# Patient Record
Sex: FEMALE | Race: WHITE
[De-identification: names, ages, dates, MRNs, and addresses within clinical notes are randomized per-mention and may not be internally consistent; named-entity substitution may affect disease eponyms.]

---
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
## 2023-07-09 ENCOUNTER — HOSPITAL ENCOUNTER (EMERGENCY)
Age: 18
Discharge: HOME | End: 2023-07-09
Payer: COMMERCIAL

## 2023-07-09 VITALS
SYSTOLIC BLOOD PRESSURE: 111 MMHG | RESPIRATION RATE: 18 BRPM | DIASTOLIC BLOOD PRESSURE: 66 MMHG | OXYGEN SATURATION: 97 % | HEART RATE: 58 BPM

## 2023-07-09 VITALS
TEMPERATURE: 97.88 F | SYSTOLIC BLOOD PRESSURE: 156 MMHG | DIASTOLIC BLOOD PRESSURE: 94 MMHG | RESPIRATION RATE: 18 BRPM | HEART RATE: 91 BPM | OXYGEN SATURATION: 97 %

## 2023-07-09 VITALS
TEMPERATURE: 97.8 F | HEART RATE: 63 BPM | OXYGEN SATURATION: 100 % | RESPIRATION RATE: 18 BRPM | DIASTOLIC BLOOD PRESSURE: 60 MMHG | SYSTOLIC BLOOD PRESSURE: 112 MMHG

## 2023-07-09 VITALS
OXYGEN SATURATION: 96 % | RESPIRATION RATE: 20 BRPM | SYSTOLIC BLOOD PRESSURE: 141 MMHG | HEART RATE: 66 BPM | DIASTOLIC BLOOD PRESSURE: 62 MMHG

## 2023-07-09 VITALS — BODY MASS INDEX: 29.7 KG/M2

## 2023-07-09 DIAGNOSIS — R10.9: ICD-10-CM

## 2023-07-09 DIAGNOSIS — N93.9: Primary | ICD-10-CM

## 2023-07-09 LAB
ANION GAP SERPL CALC-SCNC: 9.9 MEQ/L (ref 5–15)
BUN SERPL-MCNC: 10 MG/DL (ref 7–17)
CALCIUM SPEC-MCNC: 8.7 MG/DL (ref 8.4–10.2)
CHLORIDE SPEC-SCNC: 107 MMOL/L (ref 98–107)
CO2 SERPL-SCNC: 27 MMOL/L (ref 22–30)
CREAT BLD-SCNC: 0.7 MG/DL (ref 0.52–1.04)
CREATININE CLEARANCE ESTIMATED: 156 ML/MIN (ref 50–200)
ESTIMATED GLOMERULAR FILT RATE: 108 ML/MIN (ref 60–?)
GFR (AFRICAN AMERICAN): 130 ML/MIN (ref 60–?)
GLUCOSE: 93 MG/DL (ref 74–100)
HCT VFR BLD CALC: 39.7 % (ref 37–47)
HGB BLD-MCNC: 12.8 G/DL (ref 12.2–16.2)
MCHC RBC-ENTMCNC: 32.1 G/DL (ref 31.8–35.4)
MCV RBC: 87 FL (ref 81–99)
MEAN CORPUSCULAR HEMOGLOBIN: 28 PG (ref 27–31.2)
PLATELET # BLD: 224 K/MM3 (ref 142–424)
POTASSIUM: 3.9 MMOL/L (ref 3.5–5.1)
RBC # BLD AUTO: 4.56 M/MM3 (ref 4.2–5.4)
SODIUM SPEC-SCNC: 140 MMOL/L (ref 136–145)
WBC # BLD AUTO: 10.2 K/MM3 (ref 4.5–13)

## 2023-07-09 PROCEDURE — 85025 COMPLETE CBC W/AUTO DIFF WBC: CPT

## 2023-07-09 PROCEDURE — 96375 TX/PRO/DX INJ NEW DRUG ADDON: CPT

## 2023-07-09 PROCEDURE — 96374 THER/PROPH/DIAG INJ IV PUSH: CPT

## 2023-07-09 PROCEDURE — 99284 EMERGENCY DEPT VISIT MOD MDM: CPT

## 2023-07-09 PROCEDURE — 84703 CHORIONIC GONADOTROPIN ASSAY: CPT

## 2023-07-09 PROCEDURE — 80048 BASIC METABOLIC PNL TOTAL CA: CPT

## 2023-07-26 ENCOUNTER — OFFICE VISIT (OUTPATIENT)
Dept: OBSTETRICS AND GYNECOLOGY | Facility: CLINIC | Age: 18
End: 2023-07-26
Payer: COMMERCIAL

## 2023-07-26 VITALS
HEIGHT: 63 IN | SYSTOLIC BLOOD PRESSURE: 100 MMHG | WEIGHT: 167.2 LBS | BODY MASS INDEX: 29.62 KG/M2 | DIASTOLIC BLOOD PRESSURE: 70 MMHG

## 2023-07-26 DIAGNOSIS — Z20.2 POSSIBLE EXPOSURE TO STD: Primary | ICD-10-CM

## 2023-07-26 DIAGNOSIS — Z30.8 ENCOUNTER FOR OTHER CONTRACEPTIVE MANAGEMENT: ICD-10-CM

## 2023-07-26 NOTE — PROGRESS NOTES
"            Chief Complaint   Patient presents with    STD Testing       Subjective   HPI  Heather Stallings is a 17 y.o. female, .   Patient's last menstrual period was 2023 (exact date).. who presents for repeat STD testing.  The sample that was collected on 2023 could not be tested.  The pain that she complained of then has resolved.  She has no complaints.  Patient would also like to discuss going on birth control today while she is here.  She is interested in Nexplanon.     Additional OB/GYN History     Last Pap : never due to age   Last mammogram: never     OB History          0    Para   0    Term   0       0    AB   0    Living   0         SAB   0    IAB   0    Ectopic   0    Molar   0    Multiple   0    Live Births   0                  Current Outpatient Medications:     fluconazole (Diflucan) 150 MG tablet, 1 po now and repeat in 3 days, Disp: 2 tablet, Rfl: 0    ibuprofen (ADVIL,MOTRIN) 600 MG tablet, Take 1 tablet by mouth Every 6 (Six) Hours As Needed for Mild Pain., Disp: 30 tablet, Rfl: 0    montelukast (SINGULAIR) 10 MG tablet, Take 1 tablet by mouth Every Night., Disp: , Rfl:      Past Medical History:   Diagnosis Date    Allergies     Ovarian cyst         Past Surgical History:   Procedure Laterality Date    TONSILLECTOMY         The additional following portions of the patient's history were reviewed and updated as appropriate: allergies, current medications, past family history, past medical history, past social history, past surgical history, and problem list.    Review of Systems   All other systems reviewed and are negative.    I have reviewed and agree with the HPI, ROS, and historical information as entered above. Cris Rahman, APRN      Objective   /70   Ht 160 cm (62.99\")   Wt 75.8 kg (167 lb 3.2 oz)   LMP 2023 (Exact Date)   BMI 29.63 kg/m²     Physical Exam  Vitals and nursing note reviewed. Exam conducted with a chaperone present. "   Constitutional:       General: She is not in acute distress.     Appearance: Normal appearance. She is not ill-appearing.   Pulmonary:      Effort: Pulmonary effort is normal. No respiratory distress.   Abdominal:      General: There is no distension.      Palpations: Abdomen is soft. There is no mass.      Tenderness: There is no abdominal tenderness. There is no guarding or rebound.      Hernia: No hernia is present.   Genitourinary:     General: Normal vulva.      Vagina: Normal.      Cervix: Normal.      Uterus: Normal.       Adnexa: Right adnexa normal and left adnexa normal.   Skin:     General: Skin is warm and dry.   Neurological:      Mental Status: She is alert and oriented to person, place, and time.   Psychiatric:         Mood and Affect: Mood normal.         Behavior: Behavior normal.       Assessment & Plan     Assessment     Problem List Items Addressed This Visit    None  Visit Diagnoses       Possible exposure to STD    -  Primary    Relevant Orders    Chlamydia trachomatis, Neisseria gonorrhoeae, Trichomonas vaginalis, PCR - Swab, Cervix    Encounter for other contraceptive management                Plan     Rev risks, benefits, side effects of Nexplanon.  No UPI for at least 2 wks and call with next period for insertion appt.  Info given.  Questions answered.  Will notify of Nuswab results.      Cris Rahman, APRN  07/26/2023

## 2023-07-31 LAB
C TRACH RRNA SPEC QL NAA+PROBE: NEGATIVE
N GONORRHOEA RRNA SPEC QL NAA+PROBE: NEGATIVE
T VAGINALIS RRNA SPEC QL NAA+PROBE: NEGATIVE

## 2023-08-09 ENCOUNTER — OFFICE VISIT (OUTPATIENT)
Dept: OBSTETRICS AND GYNECOLOGY | Facility: CLINIC | Age: 18
End: 2023-08-09
Payer: COMMERCIAL

## 2023-08-09 VITALS
RESPIRATION RATE: 18 BRPM | HEIGHT: 63 IN | SYSTOLIC BLOOD PRESSURE: 106 MMHG | WEIGHT: 165 LBS | BODY MASS INDEX: 29.23 KG/M2 | DIASTOLIC BLOOD PRESSURE: 72 MMHG

## 2023-08-09 DIAGNOSIS — Z30.017 INSERTION OF NEXPLANON: ICD-10-CM

## 2023-08-09 DIAGNOSIS — Z32.02 ENCOUNTER FOR PREGNANCY TEST WITH RESULT NEGATIVE: Primary | ICD-10-CM

## 2023-08-09 LAB
B-HCG UR QL: NEGATIVE
EXPIRATION DATE: NORMAL
INTERNAL NEGATIVE CONTROL: NEGATIVE
INTERNAL POSITIVE CONTROL: POSITIVE
Lab: NORMAL

## 2023-08-09 NOTE — PROGRESS NOTES
Nexplanon Insertion:    Procedures    Pre Dx: 1) Nexplanon Insertion   Post Dx: 1) Nexplanon Insertion     NDC #: 4411-2904-48  Lot #: [id number]   Exp Date: 05192025  BH device  Patient's LMP was 08/05/2023  She did have a UPT in office today. The results were Negative.    Risks, benefits, alternatives explained. All questions answered. Consents signed.    Patient does not have an allergy to betadine or shellfish.    Patient placed on examined table in supine position with her Left arm flexed at the elbow and hand resting under her head.  The area for insertion prepped with betadine in a sterile fashion.      The insertion site was identified approximately 8-10 cm proximal to the humoral epicondyle and 3-4 cm below with sulcus of the triceps and biceps muscle. The skin at the insertion site was stretched by my thumb and index finger. The insertion site was anesthetized about 3 mL of 1% lidocaine. Nex, the needle tip was inserted, bevel side up, at an angle not greater than 30ø to the skin surface, just until the skin was penetrated to below the bevel. The applicator was then lowered so that it was parallel to the arm. To minimize the chance of deep incision, the skin was tented upwards with the tip of the needle. The needle was then gently inserted to the full length and the device was fixed in place. I then slowly and carefully retracted the needle back by retracting the release lever. The obturator was seen in the device to determine that the nexplanon had been released.     Both the patient and I were easily able to feel the device under the skin. Steri-Strips were applied at the site, and the arm was gently wrapped with gauze.    There were no complications.   The patient tolerated the procedure well.    She was given a reminder card. She was advised to use condoms as a backup method for at least a week after insertion.     She understands that the device terms in three years.     Expectations, warning  signs and limitations reviewed.     Cris Rahman, APRN  08/09/2023

## 2023-09-23 ENCOUNTER — HOSPITAL ENCOUNTER (EMERGENCY)
Age: 18
Discharge: HOME | End: 2023-09-23
Payer: COMMERCIAL

## 2023-09-23 VITALS
OXYGEN SATURATION: 100 % | SYSTOLIC BLOOD PRESSURE: 119 MMHG | DIASTOLIC BLOOD PRESSURE: 73 MMHG | RESPIRATION RATE: 20 BRPM | HEART RATE: 65 BPM

## 2023-09-23 VITALS
RESPIRATION RATE: 20 BRPM | OXYGEN SATURATION: 100 % | SYSTOLIC BLOOD PRESSURE: 135 MMHG | HEART RATE: 99 BPM | DIASTOLIC BLOOD PRESSURE: 89 MMHG

## 2023-09-23 VITALS
RESPIRATION RATE: 17 BRPM | OXYGEN SATURATION: 98 % | TEMPERATURE: 98 F | HEART RATE: 63 BPM | DIASTOLIC BLOOD PRESSURE: 69 MMHG | SYSTOLIC BLOOD PRESSURE: 119 MMHG

## 2023-09-23 VITALS
SYSTOLIC BLOOD PRESSURE: 105 MMHG | HEART RATE: 65 BPM | RESPIRATION RATE: 16 BRPM | OXYGEN SATURATION: 100 % | DIASTOLIC BLOOD PRESSURE: 62 MMHG | TEMPERATURE: 98.42 F

## 2023-09-23 VITALS — BODY MASS INDEX: 29.2 KG/M2

## 2023-09-23 DIAGNOSIS — S00.93XA: Primary | ICD-10-CM
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
## 2023-09-23 PROCEDURE — 99283 EMERGENCY DEPT VISIT LOW MDM: CPT

## 2023-09-23 PROCEDURE — 96372 THER/PROPH/DIAG INJ SC/IM: CPT

## 2024-01-10 ENCOUNTER — HOSPITAL ENCOUNTER (EMERGENCY)
Age: 19
LOS: 1 days | Discharge: HOME | End: 2024-01-11
Payer: COMMERCIAL

## 2024-01-10 VITALS
RESPIRATION RATE: 15 BRPM | SYSTOLIC BLOOD PRESSURE: 156 MMHG | OXYGEN SATURATION: 99 % | DIASTOLIC BLOOD PRESSURE: 101 MMHG | TEMPERATURE: 97.88 F | HEART RATE: 109 BPM

## 2024-01-10 VITALS — BODY MASS INDEX: 30.8 KG/M2

## 2024-01-10 DIAGNOSIS — H57.11: Primary | ICD-10-CM

## 2024-01-10 PROCEDURE — 99283 EMERGENCY DEPT VISIT LOW MDM: CPT

## 2024-01-11 VITALS
TEMPERATURE: 98.06 F | DIASTOLIC BLOOD PRESSURE: 85 MMHG | RESPIRATION RATE: 14 BRPM | SYSTOLIC BLOOD PRESSURE: 131 MMHG | OXYGEN SATURATION: 100 % | HEART RATE: 67 BPM

## 2024-02-29 ENCOUNTER — HOSPITAL ENCOUNTER (EMERGENCY)
Age: 19
Discharge: HOME | End: 2024-02-29
Payer: COMMERCIAL

## 2024-02-29 VITALS
OXYGEN SATURATION: 100 % | TEMPERATURE: 98.24 F | SYSTOLIC BLOOD PRESSURE: 132 MMHG | RESPIRATION RATE: 22 BRPM | HEART RATE: 92 BPM | DIASTOLIC BLOOD PRESSURE: 78 MMHG

## 2024-02-29 VITALS
SYSTOLIC BLOOD PRESSURE: 136 MMHG | HEART RATE: 90 BPM | OXYGEN SATURATION: 100 % | DIASTOLIC BLOOD PRESSURE: 72 MMHG | TEMPERATURE: 98.3 F | RESPIRATION RATE: 20 BRPM

## 2024-02-29 VITALS
DIASTOLIC BLOOD PRESSURE: 120 MMHG | RESPIRATION RATE: 20 BRPM | OXYGEN SATURATION: 100 % | SYSTOLIC BLOOD PRESSURE: 138 MMHG | HEART RATE: 97 BPM

## 2024-02-29 VITALS — OXYGEN SATURATION: 100 % | DIASTOLIC BLOOD PRESSURE: 73 MMHG | HEART RATE: 65 BPM | SYSTOLIC BLOOD PRESSURE: 127 MMHG

## 2024-02-29 VITALS
OXYGEN SATURATION: 100 % | DIASTOLIC BLOOD PRESSURE: 76 MMHG | SYSTOLIC BLOOD PRESSURE: 126 MMHG | HEART RATE: 84 BPM | RESPIRATION RATE: 20 BRPM

## 2024-02-29 VITALS — BODY MASS INDEX: 30.1 KG/M2

## 2024-02-29 VITALS — HEART RATE: 67 BPM | DIASTOLIC BLOOD PRESSURE: 62 MMHG | OXYGEN SATURATION: 100 % | SYSTOLIC BLOOD PRESSURE: 111 MMHG

## 2024-02-29 DIAGNOSIS — F17.200: ICD-10-CM

## 2024-02-29 DIAGNOSIS — R11.2: ICD-10-CM

## 2024-02-29 DIAGNOSIS — R10.13: Primary | ICD-10-CM

## 2024-02-29 LAB
ALBUMIN LEVEL: 4 G/DL (ref 3.5–5)
ALBUMIN/GLOB SERPL: 1.5 {RATIO} (ref 1.1–1.8)
ALP ISO SERPL-ACNC: 50 U/L (ref 38–126)
ALT SERPLBLD-CCNC: 17 U/L (ref 12–78)
ANION GAP SERPL CALC-SCNC: 3.9 MEQ/L (ref 5–15)
AST SERPL QL: 25 U/L (ref 14–36)
BILIRUBIN,TOTAL: 0.4 MG/DL (ref 0.2–1.3)
BUN SERPL-MCNC: 10 MG/DL (ref 7–17)
CALCIUM SPEC-MCNC: 8.7 MG/DL (ref 8.4–10.2)
CHLORIDE SPEC-SCNC: 109 MMOL/L (ref 98–107)
CO2 SERPL-SCNC: 29 MMOL/L (ref 22–30)
COLOR UR: YELLOW
CREATININE CLEARANCE ESTIMATED: 137 ML/MIN (ref 50–200)
CREATININE,SERUM: 0.8 MG/DL (ref 0.52–1.04)
ESTIMATED GLOMERULAR FILT RATE: 91 ML/MIN (ref 60–?)
GFR (AFRICAN AMERICAN): 111 ML/MIN (ref 60–?)
GLOBULIN SER CALC-MCNC: 2.6 G/DL (ref 1.3–3.2)
GLUCOSE: 108 MG/DL (ref 74–100)
HCT VFR BLD CALC: 37.4 % (ref 37–47)
HGB BLD-MCNC: 12.3 G/DL (ref 12.2–16.2)
LIPASE: 40 U/L (ref 23–300)
MCHC RBC-ENTMCNC: 33 G/DL (ref 31.8–35.4)
MCV RBC: 93.1 FL (ref 81–99)
MEAN CORPUSCULAR HEMOGLOBIN: 30.7 PG (ref 27–31.2)
MICRO URNS: (no result)
PH UR: 7 [PH] (ref 5–8.5)
PLATELET # BLD: 240 K/MM3 (ref 142–424)
POTASSIUM: 3.9 MMOL/L (ref 3.5–5.1)
PROT SERPL-MCNC: 6.6 G/DL (ref 6.3–8.2)
RBC # BLD AUTO: 4.02 M/MM3 (ref 4.2–5.4)
SODIUM SPEC-SCNC: 138 MMOL/L (ref 136–145)
SP GR UR: 1.01 (ref 1–1.03)
UROBILINOGEN UR QL: 0.2 EU/DL
WBC # BLD AUTO: 6.3 K/MM3 (ref 4.5–13)

## 2024-02-29 PROCEDURE — 80053 COMPREHEN METABOLIC PANEL: CPT

## 2024-02-29 PROCEDURE — 96375 TX/PRO/DX INJ NEW DRUG ADDON: CPT

## 2024-02-29 PROCEDURE — 99285 EMERGENCY DEPT VISIT HI MDM: CPT

## 2024-02-29 PROCEDURE — 87636 SARSCOV2 & INF A&B AMP PRB: CPT

## 2024-02-29 PROCEDURE — 74177 CT ABD & PELVIS W/CONTRAST: CPT

## 2024-02-29 PROCEDURE — 84702 CHORIONIC GONADOTROPIN TEST: CPT

## 2024-02-29 PROCEDURE — 85025 COMPLETE CBC W/AUTO DIFF WBC: CPT

## 2024-02-29 PROCEDURE — 96361 HYDRATE IV INFUSION ADD-ON: CPT

## 2024-02-29 PROCEDURE — 83605 ASSAY OF LACTIC ACID: CPT

## 2024-02-29 PROCEDURE — 81001 URINALYSIS AUTO W/SCOPE: CPT

## 2024-02-29 PROCEDURE — 83690 ASSAY OF LIPASE: CPT

## 2024-02-29 PROCEDURE — 96374 THER/PROPH/DIAG INJ IV PUSH: CPT

## 2024-03-04 ENCOUNTER — HOSPITAL ENCOUNTER (EMERGENCY)
Age: 19
Discharge: HOME | End: 2024-03-04
Payer: COMMERCIAL

## 2024-03-04 VITALS — HEART RATE: 64 BPM | DIASTOLIC BLOOD PRESSURE: 69 MMHG | OXYGEN SATURATION: 100 % | SYSTOLIC BLOOD PRESSURE: 93 MMHG

## 2024-03-04 VITALS
RESPIRATION RATE: 20 BRPM | OXYGEN SATURATION: 98 % | HEART RATE: 94 BPM | DIASTOLIC BLOOD PRESSURE: 72 MMHG | SYSTOLIC BLOOD PRESSURE: 119 MMHG | TEMPERATURE: 98.42 F

## 2024-03-04 VITALS — BODY MASS INDEX: 0.3 KG/M2

## 2024-03-04 VITALS
DIASTOLIC BLOOD PRESSURE: 52 MMHG | RESPIRATION RATE: 16 BRPM | OXYGEN SATURATION: 100 % | SYSTOLIC BLOOD PRESSURE: 101 MMHG | HEART RATE: 60 BPM | TEMPERATURE: 98.5 F

## 2024-03-04 DIAGNOSIS — F17.210: ICD-10-CM

## 2024-03-04 DIAGNOSIS — R11.2: ICD-10-CM

## 2024-03-04 DIAGNOSIS — R10.84: Primary | ICD-10-CM

## 2024-03-04 LAB
ALBUMIN LEVEL: 4.6 G/DL (ref 3.5–5)
ALBUMIN/GLOB SERPL: 1.8 {RATIO} (ref 1.1–1.8)
ALP ISO SERPL-ACNC: 54 U/L (ref 38–126)
ALT SERPLBLD-CCNC: 17 U/L (ref 12–78)
AMORPH SED URNS QL MICRO: (no result) /LPF
ANION GAP SERPL CALC-SCNC: 6.2 MEQ/L (ref 5–15)
AST SERPL QL: 24 U/L (ref 14–36)
BILIRUBIN,TOTAL: 0.3 MG/DL (ref 0.2–1.3)
BUN SERPL-MCNC: 10 MG/DL (ref 7–17)
CALCIUM SPEC-MCNC: 9.1 MG/DL (ref 8.4–10.2)
CHLORIDE SPEC-SCNC: 105 MMOL/L (ref 98–107)
CO2 SERPL-SCNC: 30 MMOL/L (ref 22–30)
COLOR UR: YELLOW
CREATININE CLEARANCE ESTIMATED: 137 ML/MIN (ref 50–200)
CREATININE,SERUM: 0.8 MG/DL (ref 0.52–1.04)
ESTIMATED GLOMERULAR FILT RATE: 91 ML/MIN (ref 60–?)
GFR (AFRICAN AMERICAN): 111 ML/MIN (ref 60–?)
GLOBULIN SER CALC-MCNC: 2.5 G/DL (ref 1.3–3.2)
GLUCOSE: 91 MG/DL (ref 74–100)
HCT VFR BLD CALC: 39.7 % (ref 37–47)
HGB BLD-MCNC: 12.5 G/DL (ref 12.2–16.2)
LIPASE: 35 U/L (ref 23–300)
MCHC RBC-ENTMCNC: 31.4 G/DL (ref 31.8–35.4)
MCV RBC: 92.9 FL (ref 81–99)
MEAN CORPUSCULAR HEMOGLOBIN: 29.2 PG (ref 27–31.2)
MICRO URNS: (no result)
PH UR: 8 [PH] (ref 5–8.5)
PLATELET # BLD: 259 K/MM3 (ref 142–424)
POTASSIUM: 4.2 MMOL/L (ref 3.5–5.1)
PROT SERPL-MCNC: 7.1 G/DL (ref 6.3–8.2)
RBC # BLD AUTO: 4.27 M/MM3 (ref 4.2–5.4)
SODIUM SPEC-SCNC: 137 MMOL/L (ref 136–145)
SP GR UR: 1.01 (ref 1–1.03)
SQUAMOUS URNS QL MICRO: (no result) #/HPF (ref 0–5)
UROBILINOGEN UR QL: 0.2 EU/DL
WBC # BLD AUTO: 7.6 K/MM3 (ref 4.5–13)

## 2024-03-04 PROCEDURE — 85025 COMPLETE CBC W/AUTO DIFF WBC: CPT

## 2024-03-04 PROCEDURE — 96361 HYDRATE IV INFUSION ADD-ON: CPT

## 2024-03-04 PROCEDURE — 80053 COMPREHEN METABOLIC PANEL: CPT

## 2024-03-04 PROCEDURE — 81025 URINE PREGNANCY TEST: CPT

## 2024-03-04 PROCEDURE — 81001 URINALYSIS AUTO W/SCOPE: CPT

## 2024-03-04 PROCEDURE — 83690 ASSAY OF LIPASE: CPT

## 2024-03-04 PROCEDURE — 96374 THER/PROPH/DIAG INJ IV PUSH: CPT

## 2024-03-04 PROCEDURE — 96375 TX/PRO/DX INJ NEW DRUG ADDON: CPT

## 2024-03-04 PROCEDURE — 99285 EMERGENCY DEPT VISIT HI MDM: CPT

## 2024-06-17 ENCOUNTER — HOSPITAL ENCOUNTER (EMERGENCY)
Age: 19
Discharge: HOME | End: 2024-06-17
Payer: COMMERCIAL

## 2024-06-17 VITALS
DIASTOLIC BLOOD PRESSURE: 80 MMHG | SYSTOLIC BLOOD PRESSURE: 117 MMHG | RESPIRATION RATE: 19 BRPM | OXYGEN SATURATION: 100 % | TEMPERATURE: 98.42 F | HEART RATE: 65 BPM

## 2024-06-17 VITALS
HEART RATE: 65 BPM | SYSTOLIC BLOOD PRESSURE: 117 MMHG | DIASTOLIC BLOOD PRESSURE: 80 MMHG | RESPIRATION RATE: 19 BRPM | OXYGEN SATURATION: 100 % | TEMPERATURE: 98.5 F

## 2024-06-17 VITALS — BODY MASS INDEX: 32.4 KG/M2

## 2024-06-17 DIAGNOSIS — S60.111A: Primary | ICD-10-CM
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
## 2024-06-17 DIAGNOSIS — F17.210: ICD-10-CM

## 2024-06-17 PROCEDURE — G0463 HOSPITAL OUTPT CLINIC VISIT: HCPCS

## 2024-06-17 PROCEDURE — 73120 X-RAY EXAM OF HAND: CPT

## 2024-06-17 PROCEDURE — 99214 OFFICE O/P EST MOD 30 MIN: CPT

## 2024-06-17 PROCEDURE — 73100 X-RAY EXAM OF WRIST: CPT

## 2024-06-17 PROCEDURE — 99212 OFFICE O/P EST SF 10 MIN: CPT

## 2024-08-28 ENCOUNTER — OFFICE VISIT (OUTPATIENT)
Dept: OBSTETRICS AND GYNECOLOGY | Facility: CLINIC | Age: 19
End: 2024-08-28
Payer: COMMERCIAL

## 2024-08-28 VITALS
TEMPERATURE: 99 F | SYSTOLIC BLOOD PRESSURE: 122 MMHG | DIASTOLIC BLOOD PRESSURE: 70 MMHG | HEIGHT: 63 IN | WEIGHT: 184 LBS | BODY MASS INDEX: 32.6 KG/M2

## 2024-08-28 DIAGNOSIS — R10.31 BILATERAL GROIN PAIN: Primary | ICD-10-CM

## 2024-08-28 DIAGNOSIS — R10.32 BILATERAL GROIN PAIN: Primary | ICD-10-CM

## 2024-08-28 DIAGNOSIS — R59.0 LYMPHADENOPATHY, INGUINAL: ICD-10-CM

## 2024-08-28 LAB
BILIRUB BLD-MCNC: NEGATIVE MG/DL
CLARITY, POC: CLEAR
COLOR UR: YELLOW
GLUCOSE UR STRIP-MCNC: NEGATIVE MG/DL
KETONES UR QL: NEGATIVE
LEUKOCYTE EST, POC: NEGATIVE
NITRITE UR-MCNC: NEGATIVE MG/ML
PH UR: 6 [PH] (ref 5–8)
PROT UR STRIP-MCNC: NEGATIVE MG/DL
RBC # UR STRIP: ABNORMAL /UL
SP GR UR: 1.01 (ref 1–1.03)
UROBILINOGEN UR QL: NORMAL

## 2024-08-28 RX ORDER — IBUPROFEN 600 MG/1
600 TABLET, FILM COATED ORAL EVERY 6 HOURS PRN
Qty: 30 TABLET | Refills: 0 | Status: SHIPPED | OUTPATIENT
Start: 2024-08-28

## 2024-08-28 NOTE — PROGRESS NOTES
Chief Complaint   Patient presents with    pain       Subjective   HPI  Heather Stallings is a 19 y.o. female,  who presents for pain in her groin x 4 days.     Patient reports pain in her groin following a sunburn from a tanning bed on Saturday.  She reports discomfort was mild on Saturday, but has worsened each day. She reports that initially she felt a lump, but is also very swollen.     She denies dysuria, abn dc, itching, odor, pelvic pain, bowel changes.    Additional OB/GYN History       Tobacco Usage?: Yes Heather Stallings  reports that she has been smoking cigarettes. She has never used smokeless tobacco. I have educated her on the risk of diseases from using tobacco products such as cancer, COPD, and heart disease.           OB History          0    Para   0    Term   0       0    AB   0    Living   0         SAB   0    IAB   0    Ectopic   0    Molar   0    Multiple   0    Live Births   0                  Current Outpatient Medications:     fluconazole (Diflucan) 150 MG tablet, 1 po now and repeat in 3 days, Disp: 2 tablet, Rfl: 0    ibuprofen (ADVIL,MOTRIN) 600 MG tablet, Take 1 tablet by mouth Every 6 (Six) Hours As Needed for Mild Pain., Disp: 30 tablet, Rfl: 0    ibuprofen (ADVIL,MOTRIN) 600 MG tablet, Take 1 tablet by mouth Every 6 (Six) Hours As Needed for Mild Pain., Disp: 30 tablet, Rfl: 0    montelukast (SINGULAIR) 10 MG tablet, Take 1 tablet by mouth Every Night., Disp: , Rfl:      Past Medical History:   Diagnosis Date    Allergies     Ovarian cyst         Past Surgical History:   Procedure Laterality Date    TONSILLECTOMY         The additional following portions of the patient's history were reviewed and updated as appropriate: allergies, current medications, past family history, past medical history, past social history, past surgical history, and problem list.    Review of Systems    I have reviewed and agree with the HPI, ROS, and historical information as  "entered above. Cris Rahman, APRN      Objective   /70   Temp 99 °F (37.2 °C)   Ht 160 cm (62.99\")   Wt 83.5 kg (184 lb)   LMP  (LMP Unknown)   BMI 32.60 kg/m²     Physical Exam  Vitals and nursing note reviewed. Exam conducted with a chaperone present.   Constitutional:       General: She is not in acute distress.     Appearance: Normal appearance. She is obese. She is not ill-appearing.   Pulmonary:      Effort: Pulmonary effort is normal. No respiratory distress.   Abdominal:      General: There is no distension.      Palpations: Abdomen is soft. There is no mass.      Tenderness: There is no abdominal tenderness. There is no guarding or rebound.      Hernia: No hernia is present.   Genitourinary:     General: Normal vulva.      Pubic Area: No rash.       Labia:         Right: No rash, tenderness, lesion or injury.         Left: No rash, tenderness, lesion or injury.       Urethra: No urethral pain or urethral swelling.      Vagina: Normal.      Cervix: Normal.      Uterus: Normal.       Adnexa: Right adnexa normal and left adnexa normal.      Comments: Trace brown blood at cervix  Lymphadenopathy:      Lower Body: Right inguinal adenopathy present. Left inguinal adenopathy present.   Skin:     General: Skin is warm and dry.   Neurological:      Mental Status: She is alert and oriented to person, place, and time.   Psychiatric:         Mood and Affect: Mood normal.         Behavior: Behavior normal.         Assessment & Plan     Assessment     Problem List Items Addressed This Visit    None  Visit Diagnoses       Bilateral groin pain    -  Primary    Relevant Orders    POC Urinalysis Dipstick (Completed)    CBC & Differential    Urine Culture - , Urine, Clean Catch    Chlamydia trachomatis, Neisseria gonorrhoeae, PCR w/ confirmation - Swab, Urinary Bladder    Lymphadenopathy, inguinal                Plan     CCUA with 2+ blood-- spotting on Nexplanon.  Temp 99.0.  Nontender abdomen, no genital " lesions/injury.  Likely lymphadenoppathy related to sunburn.  Motrin, urine culture, urine STD testing,  CBC.  Should resolve spontaneously.  Return if symptoms worsen or fail to improve.        Cris Rahman, APRN  08/28/2024

## 2024-08-29 LAB
BASOPHILS # BLD AUTO: 0.1 X10E3/UL (ref 0–0.2)
BASOPHILS NFR BLD AUTO: 1 %
EOSINOPHIL # BLD AUTO: 0.2 X10E3/UL (ref 0–0.4)
EOSINOPHIL NFR BLD AUTO: 3 %
ERYTHROCYTE [DISTWIDTH] IN BLOOD BY AUTOMATED COUNT: 12.4 % (ref 11.7–15.4)
HCT VFR BLD AUTO: 42.9 % (ref 34–46.6)
HGB BLD-MCNC: 13.5 G/DL (ref 11.1–15.9)
IMM GRANULOCYTES # BLD AUTO: 0 X10E3/UL (ref 0–0.1)
IMM GRANULOCYTES NFR BLD AUTO: 0 %
LYMPHOCYTES # BLD AUTO: 1.5 X10E3/UL (ref 0.7–3.1)
LYMPHOCYTES NFR BLD AUTO: 20 %
MCH RBC QN AUTO: 28.2 PG (ref 26.6–33)
MCHC RBC AUTO-ENTMCNC: 31.5 G/DL (ref 31.5–35.7)
MCV RBC AUTO: 90 FL (ref 79–97)
MONOCYTES # BLD AUTO: 0.5 X10E3/UL (ref 0.1–0.9)
MONOCYTES NFR BLD AUTO: 6 %
NEUTROPHILS # BLD AUTO: 5.4 X10E3/UL (ref 1.4–7)
NEUTROPHILS NFR BLD AUTO: 70 %
PLATELET # BLD AUTO: 239 X10E3/UL (ref 150–450)
RBC # BLD AUTO: 4.79 X10E6/UL (ref 3.77–5.28)
WBC # BLD AUTO: 7.6 X10E3/UL (ref 3.4–10.8)

## 2024-09-02 LAB
BACTERIA UR CULT: NORMAL
BACTERIA UR CULT: NORMAL
C TRACH RRNA SPEC QL NAA+PROBE: NEGATIVE
N GONORRHOEA RRNA SPEC QL NAA+PROBE: NEGATIVE

## 2025-01-03 ENCOUNTER — HOSPITAL ENCOUNTER (EMERGENCY)
Age: 20
Discharge: HOME | End: 2025-01-03
Payer: COMMERCIAL

## 2025-01-03 VITALS
TEMPERATURE: 97.9 F | SYSTOLIC BLOOD PRESSURE: 118 MMHG | HEART RATE: 73 BPM | RESPIRATION RATE: 16 BRPM | DIASTOLIC BLOOD PRESSURE: 77 MMHG | OXYGEN SATURATION: 98 %

## 2025-01-03 VITALS — BODY MASS INDEX: 22.4 KG/M2

## 2025-01-03 VITALS
RESPIRATION RATE: 20 BRPM | SYSTOLIC BLOOD PRESSURE: 135 MMHG | TEMPERATURE: 97.8 F | DIASTOLIC BLOOD PRESSURE: 83 MMHG | HEART RATE: 77 BPM | OXYGEN SATURATION: 100 %

## 2025-01-03 DIAGNOSIS — M25.572: Primary | ICD-10-CM

## 2025-01-03 PROCEDURE — 99283 EMERGENCY DEPT VISIT LOW MDM: CPT

## 2025-01-03 PROCEDURE — 73600 X-RAY EXAM OF ANKLE: CPT

## 2025-05-14 ENCOUNTER — HOSPITAL ENCOUNTER (OUTPATIENT)
Dept: HOSPITAL 22 - RT | Age: 20
Discharge: HOME | End: 2025-05-14
Payer: COMMERCIAL

## 2025-05-14 VITALS
HEART RATE: 57 BPM | SYSTOLIC BLOOD PRESSURE: 158 MMHG | OXYGEN SATURATION: 99 % | DIASTOLIC BLOOD PRESSURE: 79 MMHG | RESPIRATION RATE: 17 BRPM

## 2025-05-14 VITALS
HEART RATE: 64 BPM | OXYGEN SATURATION: 99 % | DIASTOLIC BLOOD PRESSURE: 72 MMHG | SYSTOLIC BLOOD PRESSURE: 120 MMHG | RESPIRATION RATE: 17 BRPM

## 2025-05-14 VITALS
DIASTOLIC BLOOD PRESSURE: 58 MMHG | SYSTOLIC BLOOD PRESSURE: 114 MMHG | OXYGEN SATURATION: 99 % | HEART RATE: 95 BPM | RESPIRATION RATE: 17 BRPM

## 2025-05-14 VITALS
HEART RATE: 72 BPM | DIASTOLIC BLOOD PRESSURE: 73 MMHG | SYSTOLIC BLOOD PRESSURE: 120 MMHG | RESPIRATION RATE: 17 BRPM | OXYGEN SATURATION: 99 %

## 2025-05-14 VITALS
SYSTOLIC BLOOD PRESSURE: 118 MMHG | OXYGEN SATURATION: 99 % | HEART RATE: 63 BPM | RESPIRATION RATE: 17 BRPM | DIASTOLIC BLOOD PRESSURE: 75 MMHG

## 2025-05-14 DIAGNOSIS — R53.83: ICD-10-CM

## 2025-05-14 DIAGNOSIS — R06.02: ICD-10-CM

## 2025-05-14 DIAGNOSIS — Z82.79: ICD-10-CM

## 2025-05-14 DIAGNOSIS — R42: ICD-10-CM

## 2025-05-14 DIAGNOSIS — R94.31: Primary | ICD-10-CM

## 2025-05-14 LAB
ANION GAP SERPL CALC-SCNC: 4.2 MEQ/L (ref 5–15)
BUN SERPL-MCNC: 13 MG/DL (ref 7–17)
CALCIUM SPEC-MCNC: 9.2 MG/DL (ref 8.4–10.2)
CHLORIDE SPEC-SCNC: 107 MMOL/L (ref 98–107)
CO2 SERPL-SCNC: 27 MMOL/L (ref 22–30)
CREATININE CLEARANCE ESTIMATED: 203 ML/MIN (ref 50–200)
ESTIMATED GLOMERULAR FILT RATE: 106 ML/MIN (ref 60–?)
GFR (AFRICAN AMERICAN): 128 ML/MIN (ref 60–?)
GLUCOSE: 97 MG/DL (ref 74–100)
POTASSIUM: 4.2 MMOL/L (ref 3.5–5.1)
SODIUM SPEC-SCNC: 134 MMOL/L (ref 136–145)

## 2025-05-14 PROCEDURE — 80048 BASIC METABOLIC PNL TOTAL CA: CPT

## 2025-05-14 PROCEDURE — 81025 URINE PREGNANCY TEST: CPT

## 2025-05-14 PROCEDURE — 75574 CT ANGIO HRT W/3D IMAGE: CPT

## 2025-05-14 PROCEDURE — 93306 TTE W/DOPPLER COMPLETE: CPT

## 2025-05-14 RX ADMIN — IOPAMIDOL 85 ML: 755 INJECTION, SOLUTION INTRAVENOUS at 11:25

## 2025-05-14 RX ADMIN — NITROGLYCERIN 0 MG: 0.4 TABLET SUBLINGUAL at 11:49

## 2025-05-14 RX ADMIN — SODIUM CHLORIDE, PRESERVATIVE FREE 10 ML: 5 INJECTION INTRAVENOUS at 11:25

## 2025-05-14 RX ADMIN — SODIUM CHLORIDE 50 ML: 9 INJECTION, SOLUTION INTRAMUSCULAR; INTRAVENOUS; SUBCUTANEOUS at 11:25

## 2025-08-13 ENCOUNTER — OFFICE VISIT (OUTPATIENT)
Dept: OBSTETRICS AND GYNECOLOGY | Facility: CLINIC | Age: 20
End: 2025-08-13
Payer: COMMERCIAL

## 2025-08-13 VITALS
WEIGHT: 229 LBS | SYSTOLIC BLOOD PRESSURE: 112 MMHG | HEIGHT: 63 IN | DIASTOLIC BLOOD PRESSURE: 80 MMHG | BODY MASS INDEX: 40.57 KG/M2 | RESPIRATION RATE: 18 BRPM

## 2025-08-13 DIAGNOSIS — Z11.3 SCREENING FOR STD (SEXUALLY TRANSMITTED DISEASE): ICD-10-CM

## 2025-08-13 DIAGNOSIS — Z80.3 FAMILY HISTORY OF BREAST CANCER: ICD-10-CM

## 2025-08-13 DIAGNOSIS — Z01.419 WOMEN'S ANNUAL ROUTINE GYNECOLOGICAL EXAMINATION: ICD-10-CM

## 2025-08-13 DIAGNOSIS — Z30.46 ENCOUNTER FOR REMOVAL AND REINSERTION OF NEXPLANON: Primary | ICD-10-CM

## 2025-08-13 DIAGNOSIS — Z01.419 PAP TEST, AS PART OF ROUTINE GYNECOLOGICAL EXAMINATION: ICD-10-CM

## 2025-08-13 RX ORDER — HYDROXYZINE HYDROCHLORIDE 25 MG/1
TABLET, FILM COATED ORAL
COMMUNITY
Start: 2025-05-07

## 2025-08-13 RX ORDER — LEVOTHYROXINE SODIUM 25 UG/1
TABLET ORAL
COMMUNITY
Start: 2025-07-31

## 2025-08-14 LAB — REF LAB TEST METHOD: NORMAL
